# Patient Record
Sex: FEMALE | NOT HISPANIC OR LATINO | URBAN - METROPOLITAN AREA
[De-identification: names, ages, dates, MRNs, and addresses within clinical notes are randomized per-mention and may not be internally consistent; named-entity substitution may affect disease eponyms.]

---

## 2024-09-04 ENCOUNTER — APPOINTMENT (RX ONLY)
Dept: URBAN - METROPOLITAN AREA CLINIC 20 | Facility: CLINIC | Age: 69
Setting detail: DERMATOLOGY
End: 2024-09-04

## 2024-09-04 DIAGNOSIS — Z41.9 ENCOUNTER FOR PROCEDURE FOR PURPOSES OTHER THAN REMEDYING HEALTH STATE, UNSPECIFIED: ICD-10-CM

## 2024-09-04 PROCEDURE — ? ADDITIONAL NOTES

## 2024-09-04 PROCEDURE — ? INVENTORY

## 2024-09-04 PROCEDURE — ? COSMETIC CONSULTATION: POTENZA RF MICRONEEDLING

## 2024-09-04 NOTE — PROCEDURE: ADDITIONAL NOTES
Render Risk Assessment In Note?: no
Additional Notes: Patient understands procedure will not remove deep lines 100% and 3-5 + treatments may be necessary for optimal results.  \\nDermal  reserve.
Detail Level: Simple

## 2024-09-13 ENCOUNTER — APPOINTMENT (RX ONLY)
Dept: URBAN - METROPOLITAN AREA CLINIC 20 | Facility: CLINIC | Age: 69
Setting detail: DERMATOLOGY
End: 2024-09-13

## 2024-09-13 DIAGNOSIS — Z41.9 ENCOUNTER FOR PROCEDURE FOR PURPOSES OTHER THAN REMEDYING HEALTH STATE, UNSPECIFIED: ICD-10-CM

## 2024-09-13 PROCEDURE — ? INVENTORY

## 2024-09-13 PROCEDURE — ? RF MICRONEEDLING

## 2024-09-13 PROCEDURE — ? ADDITIONAL NOTES

## 2024-09-13 NOTE — PROCEDURE: RF MICRONEEDLING
Location Override (Optional): eyelids x 2 passes
Indication: skin tightening and resurfacing
Laser: Endymed Intensif
Information: You must select either a Location or Location Override for the laser information to render in the note
Level (Optional): CW2
Power In Bautista (Optional): 4
Laser Override (Optional): Sylfirm X
Power In Bautista (Optional): 7
Location Override (Optional): full face exluding eyelids
Depth In Mm: 1.3
Topical Anesthesia Type: 23% lidocaine, 7% tetracaine
Depth In Mm: 1
Detail Level: Zone
Pre-Procedure Text: The treatment areas were cleansed in the usual fashion/gentle cleanser/hibiclens and treatment proceeded as outlined above.
Depth In Mm: 1.2
Location Override (Optional): chin
Location Override (Optional): cheeks, malar crescent, mandibular, submentum
Pulse Count (Optional): Total 892
Level (Optional): CW3
Post-Care Instructions: After the procedure, take precautions against sun exposure. Use of gentle cleanser and moisturizer/exosomes immediately post treatment and first 24 hours. Do not apply sunscreen for 12 hours after the procedure. Do not apply make-up for 48 hours after the procedure. Avoid alcohol based toners for 10-14 days. After 48 hours patients can return to their regular skin regimen.
Consent: Written consent obtained, risks reviewed including but not limited to pain, scarring, infection and incomplete improvement.  Patient understands the procedure is cosmetic in nature and will require out of pocket payment.
Location Override (Optional): forehead, nose, upper lip
Depth In Mm: 0.8

## 2024-09-20 ENCOUNTER — APPOINTMENT (RX ONLY)
Dept: URBAN - METROPOLITAN AREA CLINIC 21 | Facility: CLINIC | Age: 69
Setting detail: DERMATOLOGY
End: 2024-09-20

## 2024-09-20 DIAGNOSIS — L81.4 OTHER MELANIN HYPERPIGMENTATION: ICD-10-CM

## 2024-09-20 DIAGNOSIS — L57.8 OTHER SKIN CHANGES DUE TO CHRONIC EXPOSURE TO NONIONIZING RADIATION: ICD-10-CM

## 2024-09-20 DIAGNOSIS — L82.1 OTHER SEBORRHEIC KERATOSIS: ICD-10-CM

## 2024-09-20 DIAGNOSIS — D22 MELANOCYTIC NEVI: ICD-10-CM

## 2024-09-20 DIAGNOSIS — D18.0 HEMANGIOMA: ICD-10-CM

## 2024-09-20 DIAGNOSIS — Z71.89 OTHER SPECIFIED COUNSELING: ICD-10-CM

## 2024-09-20 PROBLEM — D22.72 MELANOCYTIC NEVI OF LEFT LOWER LIMB, INCLUDING HIP: Status: ACTIVE | Noted: 2024-09-20

## 2024-09-20 PROBLEM — D22.61 MELANOCYTIC NEVI OF RIGHT UPPER LIMB, INCLUDING SHOULDER: Status: ACTIVE | Noted: 2024-09-20

## 2024-09-20 PROBLEM — D22.5 MELANOCYTIC NEVI OF TRUNK: Status: ACTIVE | Noted: 2024-09-20

## 2024-09-20 PROBLEM — D22.71 MELANOCYTIC NEVI OF RIGHT LOWER LIMB, INCLUDING HIP: Status: ACTIVE | Noted: 2024-09-20

## 2024-09-20 PROBLEM — D18.01 HEMANGIOMA OF SKIN AND SUBCUTANEOUS TISSUE: Status: ACTIVE | Noted: 2024-09-20

## 2024-09-20 PROBLEM — D22.62 MELANOCYTIC NEVI OF LEFT UPPER LIMB, INCLUDING SHOULDER: Status: ACTIVE | Noted: 2024-09-20

## 2024-09-20 PROBLEM — D22.9 MELANOCYTIC NEVI, UNSPECIFIED: Status: ACTIVE | Noted: 2024-09-20

## 2024-09-20 PROCEDURE — 99213 OFFICE O/P EST LOW 20 MIN: CPT

## 2024-09-20 PROCEDURE — ? COUNSELING

## 2024-09-20 ASSESSMENT — LOCATION DETAILED DESCRIPTION DERM
LOCATION DETAILED: LEFT SUPERIOR MEDIAL BUCCAL CHEEK
LOCATION DETAILED: LEFT ANTERIOR PROXIMAL THIGH
LOCATION DETAILED: RIGHT ANTERIOR DISTAL THIGH
LOCATION DETAILED: RIGHT ANTERIOR PROXIMAL UPPER ARM
LOCATION DETAILED: RIGHT SUPERIOR MEDIAL UPPER BACK
LOCATION DETAILED: RIGHT MEDIAL UPPER BACK
LOCATION DETAILED: LEFT ANTERIOR PROXIMAL UPPER ARM
LOCATION DETAILED: LEFT INFERIOR CENTRAL MALAR CHEEK
LOCATION DETAILED: RIGHT MEDIAL SUPERIOR CHEST
LOCATION DETAILED: LEFT INFERIOR MEDIAL FOREHEAD
LOCATION DETAILED: RIGHT ANTERIOR DISTAL UPPER ARM
LOCATION DETAILED: RIGHT ANTERIOR PROXIMAL THIGH
LOCATION DETAILED: HAIR
LOCATION DETAILED: LEFT ANTERIOR DISTAL THIGH
LOCATION DETAILED: UPPER STERNUM

## 2024-09-20 ASSESSMENT — LOCATION ZONE DERM
LOCATION ZONE: FACE
LOCATION ZONE: SCALP
LOCATION ZONE: LEG
LOCATION ZONE: TRUNK
LOCATION ZONE: ARM

## 2024-09-20 ASSESSMENT — LOCATION SIMPLE DESCRIPTION DERM
LOCATION SIMPLE: LEFT UPPER ARM
LOCATION SIMPLE: LEFT CHEEK
LOCATION SIMPLE: RIGHT THIGH
LOCATION SIMPLE: LEFT FOREHEAD
LOCATION SIMPLE: HAIR
LOCATION SIMPLE: LEFT THIGH
LOCATION SIMPLE: CHEST
LOCATION SIMPLE: RIGHT UPPER ARM
LOCATION SIMPLE: RIGHT UPPER BACK

## 2024-10-22 ENCOUNTER — APPOINTMENT (RX ONLY)
Dept: URBAN - METROPOLITAN AREA CLINIC 20 | Facility: CLINIC | Age: 69
Setting detail: DERMATOLOGY
End: 2024-10-22

## 2024-10-22 DIAGNOSIS — Z41.9 ENCOUNTER FOR PROCEDURE FOR PURPOSES OTHER THAN REMEDYING HEALTH STATE, UNSPECIFIED: ICD-10-CM

## 2024-10-22 PROCEDURE — ? RF MICRONEEDLING

## 2024-10-22 PROCEDURE — ? INVENTORY

## 2024-10-22 PROCEDURE — ? ADDITIONAL NOTES

## 2024-10-22 NOTE — PROCEDURE: ADDITIONAL NOTES
Detail Level: Simple
Additional Notes: 800 previous quote\\n
Render Risk Assessment In Note?: no
Additional Notes: Applied Benev gel mask post procedure for 10 minutes.

## 2024-10-22 NOTE — PROCEDURE: RF MICRONEEDLING
Indication: skin tightening and resurfacing
Treatment Number (Optional): 2
Power In Bautista (Optional): 7
Laser: Endymed Intensif
Depth In Mm: 0.8
Laser Override (Optional): Sylfirm X
Power In Bautista (Optional): 6
Level (Optional): CW2
Depth In Mm: 1
Information: You must select either a Location or Location Override for the laser information to render in the note
Power In Bautista (Optional): 4
Treatment Number (Optional): 0
Location Override (Optional): face not including eyelids
Topical Anesthesia Type: 23% lidocaine, 7% tetracaine
Pre-Procedure Text: The treatment areas were cleansed in the usual fashion/gentle cleanser/hibiclens and treatment proceeded as outlined above.
Level (Optional): CW3
Consent: Written consent obtained, risks reviewed including but not limited to pain, scarring, infection and incomplete improvement.  Patient understands the procedure is cosmetic in nature and will require out of pocket payment.
Location Override (Optional): eyelids x 2 passes
Location Override (Optional): cheeks, mandibular, submentum
Detail Level: Zone
Pulse Count (Optional): Total 968
Post-Care Instructions: After the procedure, take precautions against sun exposure. Use of gentle cleanser and moisturizer/exosomes immediately post treatment and first 24 hours. Do not apply sunscreen for 12 hours after the procedure. Do not apply make-up for 48 hours after the procedure. Avoid alcohol based toners for 10-14 days. After 48 hours patients can return to their regular skin regimen.
Depth In Mm: 1.2
Location Override (Optional): forehead, nose, upper lip, chin

## 2024-12-03 ENCOUNTER — APPOINTMENT (RX ONLY)
Dept: URBAN - METROPOLITAN AREA CLINIC 20 | Facility: CLINIC | Age: 69
Setting detail: DERMATOLOGY
End: 2024-12-03

## 2024-12-03 DIAGNOSIS — Z41.9 ENCOUNTER FOR PROCEDURE FOR PURPOSES OTHER THAN REMEDYING HEALTH STATE, UNSPECIFIED: ICD-10-CM

## 2024-12-03 PROCEDURE — ? ADDITIONAL NOTES

## 2024-12-03 PROCEDURE — ? INVENTORY

## 2024-12-03 PROCEDURE — ? RF MICRONEEDLING

## 2024-12-03 NOTE — PROCEDURE: RF MICRONEEDLING
Laser Override (Optional): Sylfirm X
Location Override (Optional): forehead, nose, upper lip, chin
Indication: skin tightening and resurfacing
Depth In Mm: 1
Information: You must select either a Location or Location Override for the laser information to render in the note
Power In Bautista (Optional): 4
Laser: Endymed Intensif
Detail Level: Zone
Depth In Mm: 0.8
Level (Optional): CW3
Treatment Number (Optional): 3
Level (Optional): CW2
Location Override (Optional): eyelids x 2 passes
Consent: Written consent obtained, risks reviewed including but not limited to pain, scarring, infection and incomplete improvement.  Patient understands the procedure is cosmetic in nature and will require out of pocket payment.
Power In Bautista (Optional): 7
Topical Anesthesia Type: 23% lidocaine, 7% tetracaine
Level (Optional): CW4/CW3
Post-Care Instructions: After the procedure, take precautions against sun exposure. Use of gentle cleanser and moisturizer/exosomes immediately post treatment and first 24 hours. Do not apply sunscreen for 12 hours after the procedure. Do not apply make-up for 48 hours after the procedure. Avoid alcohol based toners for 10-14 days. After 48 hours patients can return to their regular skin regimen.
Depth In Mm: 1.5
Treatment Number (Optional): 0
Pre-Procedure Text: The treatment areas were cleansed in the usual fashion/gentle cleanser/hibiclens and treatment proceeded as outlined above.
Location Override (Optional): cheeks, mandibular, submentum
Power In Bautista (Optional): 6
Level (Optional): CW3/CW2
Pulse Count (Optional): Total 784
Location Override (Optional): face not including eyelids

## 2024-12-03 NOTE — PROCEDURE: ADDITIONAL NOTES
Detail Level: Simple
Additional Notes: 800 previous discount.
Render Risk Assessment In Note?: no
Additional Notes: Patient applied TA prior to arrival of treatment.  Benev Exosomes gel masque sample was applied post treatment for 10 minutes.

## 2025-01-21 ENCOUNTER — APPOINTMENT (OUTPATIENT)
Dept: URBAN - METROPOLITAN AREA CLINIC 20 | Facility: CLINIC | Age: 70
Setting detail: DERMATOLOGY
End: 2025-01-21

## 2025-01-21 DIAGNOSIS — Z41.9 ENCOUNTER FOR PROCEDURE FOR PURPOSES OTHER THAN REMEDYING HEALTH STATE, UNSPECIFIED: ICD-10-CM

## 2025-01-21 PROCEDURE — ? COSMETIC FOLLOW-UP

## 2025-01-21 PROCEDURE — ? INVENTORY

## 2025-01-21 PROCEDURE — ? PHOTO-DOCUMENTATION

## 2025-01-21 NOTE — PROCEDURE: COSMETIC FOLLOW-UP
Patient Satisfaction: Very pleased
Global Improvement: Marked
Comments (Free Text): Visible evening of tone and texture post procedure.  
Side Effects Or Complications: None
Treatment Override (Free Text): Sylfirm X
Detail Level: Zone